# Patient Record
Sex: MALE | Race: WHITE | Employment: FULL TIME | URBAN - METROPOLITAN AREA
[De-identification: names, ages, dates, MRNs, and addresses within clinical notes are randomized per-mention and may not be internally consistent; named-entity substitution may affect disease eponyms.]

---

## 2021-03-07 ENCOUNTER — APPOINTMENT (OUTPATIENT)
Dept: GENERAL RADIOLOGY | Age: 36
End: 2021-03-07
Attending: EMERGENCY MEDICINE
Payer: COMMERCIAL

## 2021-03-07 ENCOUNTER — APPOINTMENT (OUTPATIENT)
Dept: ULTRASOUND IMAGING | Age: 36
End: 2021-03-07
Attending: EMERGENCY MEDICINE
Payer: COMMERCIAL

## 2021-03-07 ENCOUNTER — HOSPITAL ENCOUNTER (EMERGENCY)
Age: 36
Discharge: HOME | End: 2021-03-07
Attending: EMERGENCY MEDICINE
Payer: COMMERCIAL

## 2021-03-07 VITALS
BODY MASS INDEX: 41.52 KG/M2 | HEIGHT: 70 IN | SYSTOLIC BLOOD PRESSURE: 160 MMHG | RESPIRATION RATE: 16 BRPM | WEIGHT: 290 LBS | DIASTOLIC BLOOD PRESSURE: 91 MMHG | TEMPERATURE: 97 F | HEART RATE: 91 BPM | OXYGEN SATURATION: 99 %

## 2021-03-07 DIAGNOSIS — S86.911A STRAIN OF RIGHT KNEE, INITIAL ENCOUNTER: Primary | ICD-10-CM

## 2021-03-07 PROCEDURE — 99284 EMERGENCY DEPT VISIT MOD MDM: CPT

## 2021-03-07 PROCEDURE — 73562 X-RAY EXAM OF KNEE 3: CPT | Performed by: EMERGENCY MEDICINE

## 2021-03-07 PROCEDURE — 93971 EXTREMITY STUDY: CPT | Performed by: EMERGENCY MEDICINE

## 2021-03-07 RX ORDER — HYDROCODONE BITARTRATE AND ACETAMINOPHEN 5; 325 MG/1; MG/1
1-2 TABLET ORAL EVERY 4 HOURS PRN
Qty: 20 TABLET | Refills: 0 | Status: SHIPPED | OUTPATIENT
Start: 2021-03-07 | End: 2021-03-14

## 2021-03-07 NOTE — ED INITIAL ASSESSMENT (HPI)
Right knee injury a couple weeks ago, calf and knee swelling since Friday.  Sent here from urgent care

## 2021-03-08 NOTE — ED PROVIDER NOTES
Patient Seen in: THE [facility] Emergency Department In [place]      History   Patient presents with:  Deep Vein Thrombosis  Leg or Foot Injury    Stated Complaint: right knee injury 2-3 weeks ago.  seen at immediate care and sent here to r/o bl*    HPI/Subjec mask, eye protection, and gloves were worn throughout the duration of the exam.  Handwashing was performed prior to and after the exam.  Stethoscope and any equipment used during my examination was cleaned with super sani-cloth germicidal wipes following t evidence of acute displaced fracture or dislocation in the right knee. Small joint effusion.   Dictated by (CST): Maria M Okeefe MD on 3/07/2021 at 7:09 PM     Finalized by (CST): Maria M Okeefe MD on 3/07/2021 at 7:10 PM       [address] 2 weeks. Told him to ice and elevate. We will give him some pain medication but would like him to try anti-inflammatories.   He will follow up with orthopedics         MDM      Patient was made aware of medical condition and instructed to take medications